# Patient Record
Sex: FEMALE | Race: BLACK OR AFRICAN AMERICAN | Employment: UNEMPLOYED | URBAN - METROPOLITAN AREA
[De-identification: names, ages, dates, MRNs, and addresses within clinical notes are randomized per-mention and may not be internally consistent; named-entity substitution may affect disease eponyms.]

---

## 2019-02-04 ENCOUNTER — HOSPITAL ENCOUNTER (EMERGENCY)
Age: 39
Discharge: OTHER INSTITUTION | End: 2019-02-04
Attending: EMERGENCY MEDICINE
Payer: MEDICARE

## 2019-02-04 ENCOUNTER — HOSPITAL ENCOUNTER (INPATIENT)
Age: 39
LOS: 7 days | Discharge: HOME | DRG: 885 | End: 2019-02-11
Attending: PSYCHIATRY & NEUROLOGY | Admitting: PSYCHIATRY & NEUROLOGY
Payer: MEDICARE

## 2019-02-04 VITALS
HEART RATE: 86 BPM | DIASTOLIC BLOOD PRESSURE: 99 MMHG | TEMPERATURE: 98.1 F | OXYGEN SATURATION: 97 % | RESPIRATION RATE: 16 BRPM | BODY MASS INDEX: 36.34 KG/M2 | WEIGHT: 232 LBS | SYSTOLIC BLOOD PRESSURE: 139 MMHG

## 2019-02-04 DIAGNOSIS — F31.9 BIPOLAR DISORDER WITH PSYCHOTIC FEATURES (HCC): ICD-10-CM

## 2019-02-04 DIAGNOSIS — Z00.8 MEDICAL CLEARANCE FOR PSYCHIATRIC ADMISSION: Primary | ICD-10-CM

## 2019-02-04 PROBLEM — F31.10 BIPOLAR I DISORDER WITH MANIA (HCC): Status: ACTIVE | Noted: 2019-02-04

## 2019-02-04 LAB
ALBUMIN SERPL-MCNC: 4 G/DL (ref 3.5–5)
ALBUMIN/GLOB SERPL: 1.1 {RATIO} (ref 1.1–2.2)
ALP SERPL-CCNC: 55 U/L (ref 45–117)
ALT SERPL-CCNC: 39 U/L (ref 12–78)
AMPHET UR QL SCN: NEGATIVE
ANION GAP SERPL CALC-SCNC: 10 MMOL/L (ref 5–15)
APPEARANCE UR: ABNORMAL
AST SERPL-CCNC: 30 U/L (ref 15–37)
BACTERIA URNS QL MICRO: NEGATIVE /HPF
BARBITURATES UR QL SCN: NEGATIVE
BENZODIAZ UR QL: NEGATIVE
BILIRUB SERPL-MCNC: 0.2 MG/DL (ref 0.2–1)
BILIRUB UR QL: NEGATIVE
BUN SERPL-MCNC: 14 MG/DL (ref 6–20)
BUN/CREAT SERPL: 16 (ref 12–20)
CALCIUM SERPL-MCNC: 9.5 MG/DL (ref 8.5–10.1)
CANNABINOIDS UR QL SCN: NEGATIVE
CHLORIDE SERPL-SCNC: 102 MMOL/L (ref 97–108)
CO2 SERPL-SCNC: 24 MMOL/L (ref 21–32)
COCAINE UR QL SCN: NEGATIVE
COLOR UR: ABNORMAL
CREAT SERPL-MCNC: 0.85 MG/DL (ref 0.55–1.02)
DRUG SCRN COMMENT,DRGCM: NORMAL
EPITH CASTS URNS QL MICRO: NORMAL /LPF
ERYTHROCYTE [DISTWIDTH] IN BLOOD BY AUTOMATED COUNT: 18.1 % (ref 11.5–14.5)
ETHANOL SERPL-MCNC: <10 MG/DL
GLOBULIN SER CALC-MCNC: 3.8 G/DL (ref 2–4)
GLUCOSE SERPL-MCNC: 105 MG/DL (ref 65–100)
GLUCOSE UR STRIP.AUTO-MCNC: NEGATIVE MG/DL
HCG UR QL: NEGATIVE
HCT VFR BLD AUTO: 33.2 % (ref 35–47)
HGB BLD-MCNC: 9.9 G/DL (ref 11.5–16)
HGB UR QL STRIP: NEGATIVE
KETONES UR QL STRIP.AUTO: NEGATIVE MG/DL
LEUKOCYTE ESTERASE UR QL STRIP.AUTO: ABNORMAL
MCH RBC QN AUTO: 20.1 PG (ref 26–34)
MCHC RBC AUTO-ENTMCNC: 29.8 G/DL (ref 30–36.5)
MCV RBC AUTO: 67.5 FL (ref 80–99)
METHADONE UR QL: NEGATIVE
NITRITE UR QL STRIP.AUTO: NEGATIVE
NRBC # BLD: 0 K/UL (ref 0–0.01)
NRBC BLD-RTO: 0 PER 100 WBC
OPIATES UR QL: NEGATIVE
PCP UR QL: NEGATIVE
PH UR STRIP: 6 [PH] (ref 5–8)
PLATELET # BLD AUTO: 432 K/UL (ref 150–400)
PMV BLD AUTO: 9.1 FL (ref 8.9–12.9)
POTASSIUM SERPL-SCNC: 4 MMOL/L (ref 3.5–5.1)
PROT SERPL-MCNC: 7.8 G/DL (ref 6.4–8.2)
PROT UR STRIP-MCNC: 30 MG/DL
RBC # BLD AUTO: 4.92 M/UL (ref 3.8–5.2)
RBC #/AREA URNS HPF: NORMAL /HPF (ref 0–5)
SODIUM SERPL-SCNC: 136 MMOL/L (ref 136–145)
SP GR UR REFRACTOMETRY: 1.02 (ref 1–1.03)
UROBILINOGEN UR QL STRIP.AUTO: 0.2 EU/DL (ref 0.2–1)
WBC # BLD AUTO: 6.4 K/UL (ref 3.6–11)
WBC URNS QL MICRO: NORMAL /HPF (ref 0–4)

## 2019-02-04 PROCEDURE — 99285 EMERGENCY DEPT VISIT HI MDM: CPT

## 2019-02-04 PROCEDURE — 85027 COMPLETE CBC AUTOMATED: CPT

## 2019-02-04 PROCEDURE — 80307 DRUG TEST PRSMV CHEM ANLYZR: CPT

## 2019-02-04 PROCEDURE — 36415 COLL VENOUS BLD VENIPUNCTURE: CPT

## 2019-02-04 PROCEDURE — 81001 URINALYSIS AUTO W/SCOPE: CPT

## 2019-02-04 PROCEDURE — 80053 COMPREHEN METABOLIC PANEL: CPT

## 2019-02-04 PROCEDURE — 81025 URINE PREGNANCY TEST: CPT

## 2019-02-04 PROCEDURE — 65220000003 HC RM SEMIPRIVATE PSYCH

## 2019-02-04 RX ORDER — GLUCOSAMINE SULFATE 1500 MG
1000 POWDER IN PACKET (EA) ORAL DAILY
Status: ON HOLD | COMMUNITY
End: 2019-02-07

## 2019-02-04 RX ORDER — DOXYCYCLINE 100 MG/1
100 CAPSULE ORAL
Status: ON HOLD | COMMUNITY
End: 2019-02-07

## 2019-02-04 RX ORDER — ZOLPIDEM TARTRATE 10 MG/1
10 TABLET ORAL
Status: DISCONTINUED | OUTPATIENT
Start: 2019-02-04 | End: 2019-02-04 | Stop reason: DRUGHIGH

## 2019-02-04 RX ORDER — LORAZEPAM 1 MG/1
1 TABLET ORAL
Status: DISCONTINUED | OUTPATIENT
Start: 2019-02-04 | End: 2019-02-08

## 2019-02-04 RX ORDER — BENZTROPINE MESYLATE 1 MG/ML
2 INJECTION INTRAMUSCULAR; INTRAVENOUS
Status: DISCONTINUED | OUTPATIENT
Start: 2019-02-04 | End: 2019-02-11 | Stop reason: HOSPADM

## 2019-02-04 RX ORDER — LORAZEPAM 2 MG/ML
2 INJECTION INTRAMUSCULAR
Status: DISCONTINUED | OUTPATIENT
Start: 2019-02-04 | End: 2019-02-11 | Stop reason: HOSPADM

## 2019-02-04 RX ORDER — DICLOFENAC SODIUM 75 MG/1
TABLET, DELAYED RELEASE ORAL
Status: ON HOLD | COMMUNITY
End: 2019-02-07

## 2019-02-04 RX ORDER — NAPROXEN 500 MG/1
500 TABLET ORAL
Status: ON HOLD | COMMUNITY
End: 2019-02-07

## 2019-02-04 RX ORDER — QUETIAPINE FUMARATE 300 MG/1
300 TABLET, FILM COATED ORAL
COMMUNITY
End: 2019-02-11

## 2019-02-04 RX ORDER — OLANZAPINE 5 MG/1
5 TABLET ORAL
Status: DISCONTINUED | OUTPATIENT
Start: 2019-02-04 | End: 2019-02-08

## 2019-02-04 RX ORDER — BACLOFEN 10 MG/1
10 TABLET ORAL 3 TIMES DAILY
Status: ON HOLD | COMMUNITY
End: 2019-02-07

## 2019-02-04 RX ORDER — QUETIAPINE FUMARATE 50 MG/1
50 TABLET, FILM COATED ORAL
Status: ON HOLD | COMMUNITY
End: 2019-02-07

## 2019-02-04 RX ORDER — AMLODIPINE BESYLATE 10 MG/1
10 TABLET ORAL DAILY
COMMUNITY

## 2019-02-04 RX ORDER — ACETAMINOPHEN 325 MG/1
650 TABLET ORAL
Status: DISCONTINUED | OUTPATIENT
Start: 2019-02-04 | End: 2019-02-11 | Stop reason: HOSPADM

## 2019-02-04 RX ORDER — HYDROXYZINE 50 MG/1
50 TABLET, FILM COATED ORAL
COMMUNITY
End: 2019-02-11

## 2019-02-04 RX ORDER — ZOLPIDEM TARTRATE 5 MG/1
5 TABLET ORAL
Status: DISCONTINUED | OUTPATIENT
Start: 2019-02-04 | End: 2019-02-11 | Stop reason: HOSPADM

## 2019-02-04 RX ORDER — LABETALOL 100 MG/1
100 TABLET, FILM COATED ORAL 2 TIMES DAILY
COMMUNITY

## 2019-02-04 RX ORDER — ADHESIVE BANDAGE
30 BANDAGE TOPICAL DAILY PRN
Status: DISCONTINUED | OUTPATIENT
Start: 2019-02-04 | End: 2019-02-11 | Stop reason: HOSPADM

## 2019-02-04 RX ORDER — LEVOTHYROXINE SODIUM 50 UG/1
50 TABLET ORAL
COMMUNITY

## 2019-02-04 RX ORDER — BENZTROPINE MESYLATE 2 MG/1
2 TABLET ORAL
Status: DISCONTINUED | OUTPATIENT
Start: 2019-02-04 | End: 2019-02-11 | Stop reason: HOSPADM

## 2019-02-04 RX ORDER — QUETIAPINE FUMARATE 200 MG/1
200 TABLET, FILM COATED ORAL
COMMUNITY

## 2019-02-04 RX ORDER — IBUPROFEN 200 MG
1 TABLET ORAL
Status: DISCONTINUED | OUTPATIENT
Start: 2019-02-04 | End: 2019-02-11 | Stop reason: HOSPADM

## 2019-02-04 RX ORDER — ESCITALOPRAM OXALATE 10 MG/1
10 TABLET ORAL DAILY
COMMUNITY

## 2019-02-04 RX ORDER — IBUPROFEN 400 MG/1
400 TABLET ORAL
Status: DISCONTINUED | OUTPATIENT
Start: 2019-02-04 | End: 2019-02-11 | Stop reason: HOSPADM

## 2019-02-04 RX ORDER — DICYCLOMINE HYDROCHLORIDE 10 MG/1
10 CAPSULE ORAL
COMMUNITY

## 2019-02-04 NOTE — BH NOTES
TRANSFER - IN REPORT:    Verbal report received from [facility] - KAYODE RN(name) on Navid Rivas  being received from [facility] ED(unit) for routine progression of care      Report consisted of patients Situation, Background, Assessment and   Recommendations(SBAR). Information from the following report(s) SBAR was reviewed with the receiving nurse. Opportunity for questions and clarification was provided. Assessment completed upon patients arrival to unit and care assumed.

## 2019-02-04 NOTE — ED NOTES
Emergency Department Nursing Plan of Care The Nursing Plan of Care is developed from the Nursing assessment and Emergency Department Attending provider initial evaluation. The plan of care may be reviewed in the ED Provider note. The Plan of Care was developed with the following considerations:  
Patient / Family readiness to learn indicated by:verbalized understanding Persons(s) to be included in education: patient Barriers to Learning/Limitations:No 
 
Signed Casper Corrales RN   
2/4/2019   12:23 PM
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
## 2019-02-05 LAB
CHOLEST SERPL-MCNC: 169 MG/DL
GLUCOSE P FAST SERPL-MCNC: 88 MG/DL (ref 65–100)
HDLC SERPL-MCNC: 79 MG/DL
HDLC SERPL: 2.1 {RATIO} (ref 0–5)
LDLC SERPL CALC-MCNC: 77 MG/DL (ref 0–100)
LIPID PROFILE,FLP: NORMAL
TRIGL SERPL-MCNC: 65 MG/DL (ref ?–150)
TSH SERPL DL<=0.05 MIU/L-ACNC: 5.27 UIU/ML (ref 0.36–3.74)
VLDLC SERPL CALC-MCNC: 13 MG/DL

## 2019-02-05 PROCEDURE — 80061 LIPID PANEL: CPT

## 2019-02-05 PROCEDURE — 36415 COLL VENOUS BLD VENIPUNCTURE: CPT

## 2019-02-05 PROCEDURE — 84443 ASSAY THYROID STIM HORMONE: CPT

## 2019-02-05 PROCEDURE — 74011250637 HC RX REV CODE- 250/637: Performed by: PSYCHIATRY & NEUROLOGY

## 2019-02-05 PROCEDURE — 65220000003 HC RM SEMIPRIVATE PSYCH

## 2019-02-05 PROCEDURE — 82947 ASSAY GLUCOSE BLOOD QUANT: CPT

## 2019-02-05 PROCEDURE — 74011250637 HC RX REV CODE- 250/637: Performed by: NURSE PRACTITIONER

## 2019-02-05 RX ORDER — AMLODIPINE BESYLATE 5 MG/1
5 TABLET ORAL DAILY
Status: DISCONTINUED | OUTPATIENT
Start: 2019-02-06 | End: 2019-02-07

## 2019-02-05 RX ORDER — LEVOTHYROXINE SODIUM 25 UG/1
50 TABLET ORAL
Status: DISCONTINUED | OUTPATIENT
Start: 2019-02-06 | End: 2019-02-07

## 2019-02-05 RX ORDER — DICYCLOMINE HYDROCHLORIDE 10 MG/1
10 CAPSULE ORAL 4 TIMES DAILY
Status: DISCONTINUED | OUTPATIENT
Start: 2019-02-05 | End: 2019-02-07

## 2019-02-05 RX ORDER — LABETALOL 100 MG/1
100 TABLET, FILM COATED ORAL 2 TIMES DAILY
Status: DISCONTINUED | OUTPATIENT
Start: 2019-02-05 | End: 2019-02-11 | Stop reason: HOSPADM

## 2019-02-05 RX ORDER — ONDANSETRON 4 MG/1
4 TABLET, ORALLY DISINTEGRATING ORAL
Status: DISCONTINUED | OUTPATIENT
Start: 2019-02-05 | End: 2019-02-11 | Stop reason: HOSPADM

## 2019-02-05 RX ADMIN — LORAZEPAM 1 MG: 1 TABLET ORAL at 13:54

## 2019-02-05 RX ADMIN — ZOLPIDEM TARTRATE 5 MG: 5 TABLET ORAL at 21:31

## 2019-02-05 RX ADMIN — DICYCLOMINE HYDROCHLORIDE 10 MG: 10 CAPSULE ORAL at 21:52

## 2019-02-05 RX ADMIN — LABETALOL HYDROCHLORIDE 100 MG: 100 TABLET, FILM COATED ORAL at 18:31

## 2019-02-05 RX ADMIN — DICYCLOMINE HYDROCHLORIDE 10 MG: 10 CAPSULE ORAL at 18:31

## 2019-02-05 NOTE — PROGRESS NOTES
NUTRITION       Nutrition screening referral was triggered based on results obtained during nursing admission assessment for \"unsure wt loss. \"    The patient's chart was reviewed and nutrition assessment is not indicated at this time. No weight loss or decrease in appetite per chart review. Wt Readings from Last 10 Encounters:   02/04/19 108 kg (238 lb)   02/04/19 105.2 kg (232 lb)   06/13/14 73.5 kg (162 lb)     Please consult if indicated. Thank you.      Dee Gentile RD

## 2019-02-05 NOTE — ED NOTES
Bedside and Verbal shift change report given to Ladan Major (oncoming nurse) by Clem Yo RN (offgoing nurse). Report included the following information SBAR, Kardex and ED Summary.

## 2019-02-05 NOTE — PROGRESS NOTES
Problem: Discharge Planning  Goal: *Discharge to safe environment  Outcome: Progressing Towards Goal  She is homeless   She has support from her mental health skill builder   She has opened her case with [facility]   Goal: *Knowledge of medication management  Outcome: Progressing Towards Goal  She is medication complaint   Goal: *Knowledge of discharge instructions  Outcome: Not Progressing Towards Goal  She is unable to verbalize discharge instructions

## 2019-02-05 NOTE — BH NOTES
Zay triage hospitalist for medical consult. However pt is now in bed, had not slept for 48 hours and has no urgent medical need. It is requested that pt be seen in am due to her being asleep now.   It will be passed on for am to recall consult to triage hospitalist

## 2019-02-05 NOTE — BH NOTES
PRN Medication Documentation    Specific patient behavior that led to need for PRN medication: \"I need something for anxiety\"  Staff interventions attempted prior to PRN being given: discussed coping skills  PRN medication given: ativan 5 mg po  Patient response/effectiveness of PRN medication: will continue to monitor

## 2019-02-05 NOTE — BH NOTES
GROUP THERAPY PROGRESS NOTE    Andrea Linn is participating in [place].      Group time: 15 minutes    Personal goal for participation: patient goal is to talk with doctor    Goal orientation: community    Group therapy participation: active    Therapeutic interventions reviewed and discussed: yes    Impression of participation: engaged
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
## 2019-02-05 NOTE — PROGRESS NOTES
Problem: Falls - Risk of  Goal: *Absence of Falls  Document Mc Fall Risk and appropriate interventions in the flowsheet. Outcome: Progressing Towards Goal  Fall Risk Interventions:  New patient on the evening shift. She was educated on the risk of walls when waking and after taking medications. Patient stated she understood and would be very careful. Is assistance is need she was instructed to call. She is currently sleeping, no stress noted  Mobility Interventions: Assess mobility with egress test    Mentation Interventions: Adequate sleep, hydration, pain control    Medication Interventions: Teach patient to arise slowly    Elimination Interventions:  Toileting schedule/hourly rounds    History of Falls Interventions: Door open when patient unattended
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
## 2019-02-05 NOTE — INTERDISCIPLINARY ROUNDS
Behavioral Health Interdisciplinary Rounds     Patient Name: Jose Mead  Age: 34 y.o.   Room/Bed:  732/02  Primary Diagnosis: <principal problem not specified>   Admission Status: Voluntary     Readmission within 30 days: no  Power of  in place: no  Patient requires a blocked bed: no          Reason for blocked bed:     VTE Prophylaxis: No    Mobility needs/Fall risk: no  Flu Vaccine : no   Nutritional Plan: no  Consults: H and P         Labs/Testing due today?: yes    Sleep hours: 7.0       Participation in Care/Groups:  New patient  Medication Compliant?: new patient  PRNS (last 24 hours): None    Restraints (last 24 hours):  no     CIWA (range last 24 hours):     COWS (range last 24 hours):      Alcohol screening (AUDIT) completed -   AUDIT Score: 0     If applicable, date SBIRT discussed in treatment team AND documented:     Tobacco - patient is a smoker: Have You Used Tobacco in the Past 30 Days: Never a smoker  Illegal Drugs use: Have You Used Any Illegal Substances Over the Past 12 Months: No    24 hour chart check complete: no     Patient goal(s) for today:   Treatment team focus/goals: Plan to restart her medications   Progress note She was somewhat irritable in treatment team.  Stated she is always compliant with her medications    LOS:  1  Expected LOS: TBD    Financial concerns/prescription coverage:  Medicare   Date of last family contact:   Refusing family contact     Family requesting physician contact today:  no  Discharge plan: homeless   Guns in the home:   no      Outpatient provider(s): PAMELA     Participating treatment team members: Trenton [address]
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
## 2019-02-05 NOTE — ED NOTES
TRANSFER - OUT REPORT: 
 
Verbal report given to Lucero at [facility] (name) on Magaly Lee  being transferred to [facility] (unit) for routine progression of care Report consisted of patients Situation, Background, Assessment and  
Recommendations(SBAR). Information from the following report(s) SBAR, Kardex and ED Summary was reviewed with the receiving nurse. Lines:    
 
Opportunity for questions and clarification was provided.    
 
Patient transported with:
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
## 2019-02-06 PROCEDURE — 74011250637 HC RX REV CODE- 250/637: Performed by: NURSE PRACTITIONER

## 2019-02-06 PROCEDURE — 74011250636 HC RX REV CODE- 250/636: Performed by: PSYCHIATRY & NEUROLOGY

## 2019-02-06 PROCEDURE — 74011250637 HC RX REV CODE- 250/637: Performed by: PSYCHIATRY & NEUROLOGY

## 2019-02-06 PROCEDURE — 74011000250 HC RX REV CODE- 250: Performed by: PSYCHIATRY & NEUROLOGY

## 2019-02-06 PROCEDURE — 65220000003 HC RM SEMIPRIVATE PSYCH

## 2019-02-06 RX ORDER — QUETIAPINE FUMARATE 100 MG/1
200 TABLET, FILM COATED ORAL DAILY
Status: DISCONTINUED | OUTPATIENT
Start: 2019-02-06 | End: 2019-02-07

## 2019-02-06 RX ORDER — QUETIAPINE FUMARATE 100 MG/1
300 TABLET, FILM COATED ORAL
Status: DISCONTINUED | OUTPATIENT
Start: 2019-02-06 | End: 2019-02-07

## 2019-02-06 RX ORDER — QUETIAPINE FUMARATE 25 MG/1
50 TABLET, FILM COATED ORAL
Status: DISCONTINUED | OUTPATIENT
Start: 2019-02-06 | End: 2019-02-11 | Stop reason: HOSPADM

## 2019-02-06 RX ORDER — HYDROXYZINE 50 MG/1
50 TABLET, FILM COATED ORAL
Status: DISCONTINUED | OUTPATIENT
Start: 2019-02-06 | End: 2019-02-07

## 2019-02-06 RX ORDER — QUETIAPINE FUMARATE 25 MG/1
50 TABLET, FILM COATED ORAL 2 TIMES DAILY
Status: DISCONTINUED | OUTPATIENT
Start: 2019-02-06 | End: 2019-02-06

## 2019-02-06 RX ORDER — MELATONIN
1000 DAILY
Status: DISCONTINUED | OUTPATIENT
Start: 2019-02-06 | End: 2019-02-07

## 2019-02-06 RX ORDER — QUETIAPINE FUMARATE 100 MG/1
300 TABLET, FILM COATED ORAL DAILY
Status: DISCONTINUED | OUTPATIENT
Start: 2019-02-06 | End: 2019-02-06

## 2019-02-06 RX ORDER — ESCITALOPRAM OXALATE 10 MG/1
10 TABLET ORAL DAILY
Status: DISCONTINUED | OUTPATIENT
Start: 2019-02-06 | End: 2019-02-07

## 2019-02-06 RX ADMIN — QUETIAPINE FUMARATE 300 MG: 100 TABLET ORAL at 21:38

## 2019-02-06 RX ADMIN — DICYCLOMINE HYDROCHLORIDE 10 MG: 10 CAPSULE ORAL at 21:39

## 2019-02-06 RX ADMIN — LORAZEPAM 1 MG: 1 TABLET ORAL at 20:09

## 2019-02-06 RX ADMIN — LEVOTHYROXINE SODIUM 50 MCG: 25 TABLET ORAL at 06:42

## 2019-02-06 RX ADMIN — LORAZEPAM 1 MG: 1 TABLET ORAL at 07:56

## 2019-02-06 RX ADMIN — LABETALOL HYDROCHLORIDE 100 MG: 100 TABLET, FILM COATED ORAL at 08:49

## 2019-02-06 RX ADMIN — DICYCLOMINE HYDROCHLORIDE 10 MG: 10 CAPSULE ORAL at 17:15

## 2019-02-06 RX ADMIN — ESCITALOPRAM OXALATE 10 MG: 10 TABLET ORAL at 12:56

## 2019-02-06 RX ADMIN — AMLODIPINE BESYLATE 5 MG: 5 TABLET ORAL at 07:56

## 2019-02-06 RX ADMIN — WATER 20 MG: 1 INJECTION INTRAMUSCULAR; INTRAVENOUS; SUBCUTANEOUS at 10:09

## 2019-02-06 RX ADMIN — DICYCLOMINE HYDROCHLORIDE 10 MG: 10 CAPSULE ORAL at 08:49

## 2019-02-06 RX ADMIN — LABETALOL HYDROCHLORIDE 100 MG: 100 TABLET, FILM COATED ORAL at 17:16

## 2019-02-06 RX ADMIN — QUETIAPINE FUMARATE 200 MG: 100 TABLET ORAL at 10:10

## 2019-02-06 RX ADMIN — DICYCLOMINE HYDROCHLORIDE 10 MG: 10 CAPSULE ORAL at 12:56

## 2019-02-06 RX ADMIN — VITAMIN D, TAB 1000IU (100/BT) 1000 UNITS: 25 TAB at 12:56

## 2019-02-06 NOTE — BH NOTES
GROUP THERAPY PROGRESS NOTE    Rikki Gibbons did not participate in a 60 minute Acute Unit Process Group, with a focus identifying feelings, planning for the rest of the day, and discussing discharge.

## 2019-02-06 NOTE — PROGRESS NOTES
Problem: Falls - Risk of  Goal: *Absence of Falls  Document Mc Fall Risk and appropriate interventions in the flowsheet. Outcome: Progressing Towards Goal  Fall Risk Interventions:  Mobility Interventions: Assess mobility with egress test  In bed asleep with respirations noted as even and unlabored as chest was rising and falling. Will continue to monitor for safety and 15 minute checks throughout shift. Mentation Interventions: Adequate sleep, hydration, pain control    Medication Interventions: Teach patient to arise slowly    Elimination Interventions:  Toilet paper/wipes in reach    History of Falls Interventions: Door open when patient unattended
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
## 2019-02-06 NOTE — BH NOTES
PRN Medication Documentation at 2131    Specific patient behavior that led to need for PRN medication: Patient requesting sleeping aid  Staff interventions attempted prior to PRN being given: decreased stimuli  PRN medication given: Ambien 5 mg PO  Patient response/effectiveness of PRN medication: n/a

## 2019-02-06 NOTE — BH NOTES
GROUP THERAPY PROGRESS NOTE    Bam Gomes is participating in [place].      Group time: 20 minutes    Personal goal for participation: patient goal is to talk with the doctor    Goal orientation: community    Group therapy participation: minimal    Therapeutic interventions reviewed and discussed: yes    Impression of participation: engaged

## 2019-02-06 NOTE — INTERDISCIPLINARY ROUNDS
Behavioral Health Interdisciplinary Rounds     Patient Name: Floyd Richards  Age: 34 y.o. Room/Bed:  732/02  Primary Diagnosis: Bipolar I disorder with diane ([facility] [address]   Admission Status: Voluntary     Readmission within 30 days: no  Power of  in place: no  Patient requires a blocked bed: no          Reason for blocked bed:     VTE Prophylaxis: No    Mobility needs/Fall risk: no  Flu Vaccine : no   Nutritional Plan: no  Consults:          Labs/Testing due today?: no    Sleep hours: 7.50       Participation in Care/Groups:  yes  Medication Compliant?: Yes  PRNS (last 24 hours): Sleep Aid    Restraints (last 24 hours):  no     CIWA (range last 24 hours):     COWS (range last 24 hours):      Alcohol screening (AUDIT) completed -   AUDIT Score: 0     If applicable, date SBIRT discussed in treatment team AND documented:     Tobacco - patient is a smoker: Have You Used Tobacco in the Past 30 Days: Never a smoker  Illegal Drugs use: Have You Used Any Illegal Substances Over the Past 12 Months: No    24 hour chart check complete: yes     Patient goal(s) for today:   Treatment team focus/goals: Plan to restart her medications.     Progress note: She was very agitated today and required prn's     LOS:  2  Expected LOS: TBD     Financial concerns/prescription coverage:  Medicare   Date of last family contact: ROB spoke to her mental health skill builder yesterday     Family requesting physician contact today:    Discharge plan:she is homeless at this time   Guns in the home:   no      Outpatient provider(s): refer to [address] [place] [address] Road - just opened her case before admission     Participating treatment team members: Floyd Richards, * (assigned SW),
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
## 2019-02-06 NOTE — PROGRESS NOTES
Problem: Suicide/Homicide (Adult/Pediatric)  Goal: *STG: Remains safe in hospital  Outcome: Progressing Towards Goal  Received pt initially resting quietly in bed. Later up during meal time to eat dinner tray. Appears  less irritable and angry. Mood is more subdued. Staff will continue to monitor q 15 min checks.

## 2019-02-06 NOTE — BH NOTES
PRN Medication Documentation    Specific patient behavior that led to need for PRN medication:  Pt. Yelling  \"You had better give me something right now  Or I'm going to go off\"       \"No one is going to tell me what I'm going to do\"  Staff interventions attempted prior to PRN being given:  Verbally de-escalate  PRN medication given:  geodon 20 mg IM  Patient response/effectiveness of PRN medication:  Will  Continue to monitor

## 2019-02-06 NOTE — PROGRESS NOTES
Problem: Depressed Mood (Adult/Pediatric)  Goal: *STG: Remains safe in hospital  Outcome: Progressing Towards Goal  Visible on unit. Completed H and P with internal medicine. Verbalized  PTA home medications. Remains slightly paranoid and guarded.

## 2019-02-06 NOTE — PROGRESS NOTES
Problem: Suicide/Homicide (Adult/Pediatric)  Goal: *STG: Remains safe in hospital  Outcome: Progressing Towards Goal  Pt denies any suicidal or homicidal thoughts. Contracts for safety. Remains on q 15 min safety checks. Goal: *STG: Attends activities and groups  Outcome: Progressing Towards Goal  Group attendance rate has increased with appropriate interactions. Goal: *STG/LTG: Complies with medication therapy  Outcome: Progressing Towards Goal  Has been medication compliant. Verbalizes by teach back understanding of them.     Problem: Depressed Mood (Adult/Pediatric)  Goal: *STG: Verbalizes anger, guilt, and other feelings in a constructive manor  Outcome: Not Progressing Towards Goal  Tends to express feelings with negative criticism frequently of staff and other patients

## 2019-02-07 PROCEDURE — 65220000003 HC RM SEMIPRIVATE PSYCH

## 2019-02-07 PROCEDURE — 74011250637 HC RX REV CODE- 250/637: Performed by: PSYCHIATRY & NEUROLOGY

## 2019-02-07 PROCEDURE — 74011250637 HC RX REV CODE- 250/637: Performed by: NURSE PRACTITIONER

## 2019-02-07 RX ORDER — ESCITALOPRAM OXALATE 10 MG/1
10 TABLET ORAL
Status: DISCONTINUED | OUTPATIENT
Start: 2019-02-08 | End: 2019-02-11

## 2019-02-07 RX ORDER — QUETIAPINE FUMARATE 100 MG/1
350 TABLET, FILM COATED ORAL
Status: DISCONTINUED | OUTPATIENT
Start: 2019-02-07 | End: 2019-02-11 | Stop reason: HOSPADM

## 2019-02-07 RX ORDER — HYDROXYZINE 50 MG/1
50 TABLET, FILM COATED ORAL
Status: DISCONTINUED | OUTPATIENT
Start: 2019-02-07 | End: 2019-02-08

## 2019-02-07 RX ORDER — QUETIAPINE FUMARATE 100 MG/1
200 TABLET, FILM COATED ORAL
Status: DISCONTINUED | OUTPATIENT
Start: 2019-02-08 | End: 2019-02-11 | Stop reason: HOSPADM

## 2019-02-07 RX ORDER — AMLODIPINE BESYLATE 5 MG/1
10 TABLET ORAL DAILY
Status: DISCONTINUED | OUTPATIENT
Start: 2019-02-08 | End: 2019-02-11 | Stop reason: HOSPADM

## 2019-02-07 RX ORDER — DICYCLOMINE HYDROCHLORIDE 10 MG/1
10 CAPSULE ORAL
Status: DISCONTINUED | OUTPATIENT
Start: 2019-02-07 | End: 2019-02-11 | Stop reason: HOSPADM

## 2019-02-07 RX ORDER — CETIRIZINE HCL 10 MG
10 TABLET ORAL
Status: DISCONTINUED | OUTPATIENT
Start: 2019-02-07 | End: 2019-02-11 | Stop reason: HOSPADM

## 2019-02-07 RX ORDER — LEVOTHYROXINE SODIUM 25 UG/1
50 TABLET ORAL
Status: DISCONTINUED | OUTPATIENT
Start: 2019-02-08 | End: 2019-02-11 | Stop reason: HOSPADM

## 2019-02-07 RX ADMIN — DICYCLOMINE HYDROCHLORIDE 10 MG: 10 CAPSULE ORAL at 17:24

## 2019-02-07 RX ADMIN — QUETIAPINE FUMARATE 200 MG: 100 TABLET ORAL at 08:16

## 2019-02-07 RX ADMIN — LABETALOL HYDROCHLORIDE 100 MG: 100 TABLET, FILM COATED ORAL at 17:24

## 2019-02-07 RX ADMIN — HYDROXYZINE HYDROCHLORIDE 50 MG: 50 TABLET, FILM COATED ORAL at 19:39

## 2019-02-07 RX ADMIN — QUETIAPINE FUMARATE 350 MG: 100 TABLET ORAL at 21:08

## 2019-02-07 RX ADMIN — VITAMIN D, TAB 1000IU (100/BT) 1000 UNITS: 25 TAB at 08:16

## 2019-02-07 RX ADMIN — DICYCLOMINE HYDROCHLORIDE 10 MG: 10 CAPSULE ORAL at 12:29

## 2019-02-07 RX ADMIN — ESCITALOPRAM OXALATE 10 MG: 10 TABLET ORAL at 08:16

## 2019-02-07 RX ADMIN — DICYCLOMINE HYDROCHLORIDE 10 MG: 10 CAPSULE ORAL at 08:17

## 2019-02-07 RX ADMIN — AMLODIPINE BESYLATE 5 MG: 5 TABLET ORAL at 08:16

## 2019-02-07 RX ADMIN — CETIRIZINE HYDROCHLORIDE 10 MG: 10 TABLET, FILM COATED ORAL at 21:08

## 2019-02-07 RX ADMIN — LEVOTHYROXINE SODIUM 50 MCG: 25 TABLET ORAL at 06:33

## 2019-02-07 RX ADMIN — LABETALOL HYDROCHLORIDE 100 MG: 100 TABLET, FILM COATED ORAL at 08:17

## 2019-02-07 NOTE — PROGRESS NOTES
Problem: Falls - Risk of  Goal: *Absence of Falls  Document Mc Fall Risk and appropriate interventions in the flowsheet. Outcome: Progressing Towards Goal  Fall Risk Interventions:  Mobility Interventions: Assess mobility with egress test  In bed asleep with respirations noted as even and unlabored as chest was rising and falling. Will continue to monitor for safety and 15 minute checks throughout shift  Mentation Interventions: Adequate sleep, hydration, pain control    Medication Interventions: Teach patient to arise slowly    Elimination Interventions:  Toileting schedule/hourly rounds    History of Falls Interventions: Room close to nurse's station
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
## 2019-02-07 NOTE — BH NOTES
PRN Medication Documentation    Specific patient behavior that led to need for PRN medication: Patient complained of increased anxiety  Staff interventions attempted prior to PRN being given: Encouraged coping skills  PRN medication given: Ativan 1 mg PO  Patient response/effectiveness of PRN medication: Will continue to monitor.
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
## 2019-02-07 NOTE — BH NOTES
GROUP THERAPY PROGRESS NOTE    The patient Bernardo Drew is participating in [employer]. Group time: 30 minutes    Personal goal for participation: to orient the patient to the unit.     Goal orientation: successful adoption of unit rules    Group therapy participation: active    Therapeutic interventions reviewed and discussed: Yes    Impression of participation:     Kortney Tijerina  2/7/2019 10:54 AM

## 2019-02-07 NOTE — PROGRESS NOTES
Problem: Falls - Risk of  Goal: *Absence of Falls  Document Mc Fall Risk and appropriate interventions in the flowsheet. Outcome: Progressing Towards Goal  Fall Risk Interventions:  Mobility Interventions: Assess mobility with egress test  Mentation Interventions: Adequate sleep, hydration, pain control  Medication Interventions: Teach patient to arise slowly  Elimination Interventions: Toileting schedule/hourly rounds  History of Falls Interventions: Room close to nurse's station    Problem: Depressed Mood (Adult/Pediatric)  Goal: *STG: Remains safe in hospital  Outcome: Progressing Towards Goal    0730 Pt resting quietly in bed. No needs expressed. Will monitor with Q 15 safety checks. Pt is calm and cooperative with flat affect, compliant with meals and meds.

## 2019-02-07 NOTE — BH NOTES
GROUP THERAPY PROGRESS NOTE    Ji Martin partially participated in the Acute Unit's afternoon Process Group with a focus   on identifying feelings, planning for the rest of the day, and preparing for discharge. Group time: 55 minutes. Personal goal for participation: To increase the capacity to improve ones mood and structure. Goal orientation: The patient will be able to identify their feelings, develop a plan for structuring their day, and discharge planning. Group therapy participation: With prompting, this patient minimally participated in the group. Therapeutic interventions reviewed and discussed: The group members were asked to introduce   themselves to each other and to see if they could identify an emotion they are having and/or let the group know what they want to focus on for the rest of the day as they continue to make discharge plans. Impression of participation: The patient said she was feeling, \"Okay, but I don't really want to share. Jaydemarisel Dominique Fox I want to handle my business with prayer. \" The patient was alert and generally oriented. She expressed no current SI/HI. She may have been responding to internal stimuli. She seemed particularly cautious and possibly paranoid about sharing her thoughts. There was also a hint of [employer] pre-occupation. Her affect was anxious, defensive, and slightly paranoid. Her mood reflected her affect. She did have the ability to sit through the entire group session.

## 2019-02-07 NOTE — PROGRESS NOTES
Problem: Discharge Planning  Goal: *Discharge to safe environment  Outcome: Not Progressing Towards Goal  Pt is currently homeless but she does  have a mental health skill builder - counselor  Goal: *Knowledge of medication management  Outcome: Progressing Towards Goal  Pt is complaint with taking her medications and she does have an understanding of their usage.   Goal: *Knowledge of discharge instructions  Outcome: Progressing Towards Goal  Pt is unable to have realistic conversations with [id number] team in terms of d/c planning

## 2019-02-07 NOTE — BH NOTES
GROUP THERAPY PROGRESS NOTE    Diony Cardona is participating in Reflections.      Group time: 15 minutes    Personal goal for participation: Stated her day was fine, about the same as yesterday    Goal orientation: personal    Group therapy participation: minimal    Therapeutic interventions reviewed and discussed: Reflect on Pt's day    Impression of participation: Pt engagement was minimal

## 2019-02-07 NOTE — PROGRESS NOTES
Problem: Depressed Mood (Adult/Pediatric)  Goal: *STG: Participates in treatment plan  Outcome: Not Progressing Towards Goal  Variance: Other (add note)  Pt lay in bed during music therapy group. Goal: *STG: Verbalizes anger, guilt, and other feelings in a constructive manor  Outcome: Not Progressing Towards Goal  Variance: Other (add note)  Pt offers no self disclosures. Goal: *STG: Demonstrates reduction in symptoms and increase in insight into coping skills/future focused  Outcome: Not Progressing Towards Goal  Variance: Patient Condition  Pt has poor insight into formulating a discharge plan. Goal: *STG: Remains safe in hospital  Outcome: Progressing Towards Goal  Pt demonstrates no self disclosures.

## 2019-02-08 PROCEDURE — 74011250637 HC RX REV CODE- 250/637: Performed by: NURSE PRACTITIONER

## 2019-02-08 PROCEDURE — 74011250637 HC RX REV CODE- 250/637: Performed by: PSYCHIATRY & NEUROLOGY

## 2019-02-08 PROCEDURE — 65220000003 HC RM SEMIPRIVATE PSYCH

## 2019-02-08 RX ORDER — LORAZEPAM 1 MG/1
1 TABLET ORAL
Status: DISCONTINUED | OUTPATIENT
Start: 2019-02-08 | End: 2019-02-11 | Stop reason: HOSPADM

## 2019-02-08 RX ORDER — HYDROXYZINE 50 MG/1
50 TABLET, FILM COATED ORAL
Status: DISCONTINUED | OUTPATIENT
Start: 2019-02-08 | End: 2019-02-11 | Stop reason: HOSPADM

## 2019-02-08 RX ADMIN — CETIRIZINE HYDROCHLORIDE 10 MG: 10 TABLET, FILM COATED ORAL at 21:26

## 2019-02-08 RX ADMIN — LORAZEPAM 1 MG: 1 TABLET ORAL at 11:25

## 2019-02-08 RX ADMIN — LABETALOL HYDROCHLORIDE 100 MG: 100 TABLET, FILM COATED ORAL at 17:05

## 2019-02-08 RX ADMIN — ESCITALOPRAM OXALATE 10 MG: 10 TABLET ORAL at 11:25

## 2019-02-08 RX ADMIN — LABETALOL HYDROCHLORIDE 100 MG: 100 TABLET, FILM COATED ORAL at 08:03

## 2019-02-08 RX ADMIN — AMLODIPINE BESYLATE 10 MG: 5 TABLET ORAL at 08:03

## 2019-02-08 RX ADMIN — HYDROXYZINE HYDROCHLORIDE 50 MG: 50 TABLET, FILM COATED ORAL at 10:39

## 2019-02-08 RX ADMIN — LEVOTHYROXINE SODIUM 50 MCG: 25 TABLET ORAL at 06:26

## 2019-02-08 RX ADMIN — QUETIAPINE FUMARATE 350 MG: 100 TABLET ORAL at 21:25

## 2019-02-08 RX ADMIN — QUETIAPINE FUMARATE 200 MG: 100 TABLET ORAL at 11:25

## 2019-02-08 NOTE — BH NOTES
GROUP THERAPY PROGRESS NOTE    The patient Navid Rivas is participating in [employer]. Group time: 30 minutes    Personal goal for participation: to orient the patient to the unit.     Goal orientation: successful adoption of unit rules    Group therapy participation: active    Therapeutic interventions reviewed and discussed: Yes    Impression of participation:     Denise Castellanos  2/8/2019 10:36 AM

## 2019-02-08 NOTE — PROGRESS NOTES
Problem: Falls - Risk of  Goal: *Absence of Falls  Document Mc Fall Risk and appropriate interventions in the flowsheet. Outcome: Progressing Towards Goal  Fall Risk Interventions:  Mobility Interventions: Assess mobility with egress test  In bed asleep with respirations noted as even and unlabored as chest was rising falling. Will continue to monitor for safety and 15 minute checks throughout shift. Mentation Interventions: Adequate sleep, hydration, pain control    Medication Interventions: Teach patient to arise slowly    Elimination Interventions:  Toileting schedule/hourly rounds    History of Falls Interventions: Room close to nurse's station

## 2019-02-08 NOTE — PROGRESS NOTES
Problem: Patient Education: Go to Patient Education Activity  Goal: Patient/Family Education  Outcome: Progressing Towards Goal  Patient resting in bed, quietly at beginning of shift. Patient appears anxious. Lights dimmed to decrease stimulation. Will monitor with Q15 safety checks. Patient compliant with meds and meals.

## 2019-02-08 NOTE — BH NOTES
GROUP THERAPY PROGRESS NOTE    Melsisa Taina did not participate in a 70 minute Process Group on the Acute Unit with a focus identifying feelings, planning for the day, and reviewing DBT coping skills related to distress management.

## 2019-02-08 NOTE — DISCHARGE INSTRUCTIONS
DISCHARGE SUMMARY    NAME:Josiane Hinson  : 1980  MRN: [id number]    The patient Delisa Johansen exhibits the ability to control behavior in a less restrictive environment. Patient's level of functioning is improving. No assaultive/destructive behavior has been observed for the past 24 hours. No suicidal/homicidal threat or behavior has been observed for the past 24 hours. There is no evidence of serious medication side effects. Patient has not been in physical or protective restraints for at least the past 24 hours. If weapons involved, how are they secured? No weapons involved     Is patient aware of and in agreement with discharge plan? Patient is aware of discharge and is in agreement     Arrangements for medication:  Prescriptions given to patient     Copy of discharge instructions to  provider?:  Yes , fax to [address] [place] [address] Road for transportation home: taxi to homeless point of entry     Keep all follow up appointments as scheduled, continue to take prescribed medications per physician instructions. Mental health crisis number:  [phone] or your local mental health crisis line number at [address] [facility] from Nurse    PATIENT INSTRUCTIONS:    What to do at Home:  Recommended activity: Activity as tolerated. If you experience any of the following symptoms hopeless helpless overwhelming thoughts of harming self or others, uncontrolled racing paranoid thoughts, please call 911 or your local mental health crisis line number at [phone]. *  Please give a list of your current medications to your Primary Care Provider. *  Please update this list whenever your medications are discontinued, doses are      changed, or new medications (including over-the-counter products) are added. *  Please carry medication information at all times in case of emergency situations.     These are general instructions for a healthy lifestyle:    No smoking/ No tobacco products/ Avoid exposure to second hand smoke  Surgeon General's Warning:  Quitting smoking now greatly reduces serious risk to your health. Obesity, smoking, and sedentary lifestyle greatly increases your risk for illness    A healthy diet, regular physical exercise & weight monitoring are important for maintaining a healthy lifestyle    You may be retaining fluid if you have a history of heart failure or if you experience any of the following symptoms:  Weight gain of 3 pounds or more overnight or 5 pounds in a week, increased swelling in our hands or feet or shortness of breath while lying flat in bed. Please call your doctor as soon as you notice any of these symptoms; do not wait until your next office visit. Recognize signs and symptoms of STROKE:    F-face looks uneven    A-arms unable to move or move unevenly    S-speech slurred or non-existent    T-time-call 911 as soon as signs and symptoms begin-DO NOT go       Back to bed or wait to see if you get better-TIME IS BRAIN. Warning Signs of HEART ATTACK     Call 911 if you have these symptoms:   Chest discomfort. Most heart attacks involve discomfort in the center of the chest that lasts more than a few minutes, or that goes away and comes back. It can feel like uncomfortable pressure, squeezing, fullness, or pain.  Discomfort in other areas of the upper body. Symptoms can include pain or discomfort in one or both arms, the back, neck, jaw, or stomach.  Shortness of breath with or without chest discomfort.  Other signs may include breaking out in a cold sweat, nausea, or lightheadedness. Don't wait more than five minutes to call 911 - MINUTES MATTER! Fast action can save your life. Calling 911 is almost always the fastest way to get lifesaving treatment. Emergency Medical Services staff can begin treatment when they arrive -- up to an hour sooner than if someone gets to the hospital by car. The discharge information has been reviewed with the patient.   The patient verbalized understanding. Discharge medications reviewed with the patient and appropriate educational materials and side effects teaching were provided.   ___________________________________________________________________________________________________________________________________

## 2019-02-08 NOTE — BH NOTES
1113- pt appears angry. Verbally hostile. Blaming others. Nursing staff provided phone for pt to call Yocasta Chen related to discharge planning. Pt pacing loud pressured speech. See Delaney Goff RN prn note. 1150- pt pacing day room. Cursing. Blaming others. Delusion of persecution. Extended phone time provided (45 min) for pt to accommodate D/C planning.

## 2019-02-08 NOTE — BH NOTES
PRN Medication Documentation    Specific patient behavior that led to need for PRN medication: Increased anxiety  Staff interventions attempted prior to PRN being given: decreased stimuli, educated on using coping skills  PRN medication given: 50 mg Atarax PO  Patient response/effectiveness of PRN medication: at 2005 patient reported decreased anxiety, \"the medication helped\".

## 2019-02-08 NOTE — PROGRESS NOTES
Problem: Falls - Risk of  Goal: *Absence of Falls  Document Mc Fall Risk and appropriate interventions in the flowsheet. Outcome: Progressing Towards Goal  Fall Risk Interventions:  Medication Interventions: Teach patient to arise slowly    Problem: Depressed Mood (Adult/Pediatric)  Goal: *STG: Complies with medication therapy  Outcome: Progressing Towards Goal    0730 Pt up in room, greets staff appropriately. Appears calm. No needs expressed. Will monitor with Q 15 safety checks. Pt is meal and med complaint. Remains in milieu but has limited interaction with peers. Pt attended community meeting group and began pacing during process group. 1040 PRN Medication Documentation    Specific patient behavior that led to need for PRN medication: anxiety, worried about future plans   Staff interventions attempted prior to PRN being given: walking hallway, therapeutic listening, reassurance  PRN medication given: Atarax 50 mg po  Patient response/effectiveness of PRN medication: 1120 Pt escalating in agitation. 1120 PRN Medication Documentation    Specific patient behavior that led to need for PRN medication: agitated, yelling on phone, pacing, angry, blaming others  Staff interventions attempted prior to PRN being given: removing from group, allowing pt to express frustrations, previous prn  PRN medication given: Ativan 1 mg po  Patient response/effectiveness of PRN medication: 1215 Pt is calm, no longer angry affect. Resting quietly.
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
## 2019-02-08 NOTE — INTERDISCIPLINARY ROUNDS
Behavioral Health Interdisciplinary Rounds     Patient Name: Ric Malik  Age: 34 y.o. Room/Bed:  732/02  Primary Diagnosis: Bipolar I disorder with diane ([address]   Admission Status: Voluntary     Readmission within 30 days: no  Power of  in place: no  Patient requires a blocked bed: no          Reason for blocked bed:     VTE Prophylaxis: No    Mobility needs/Fall risk: no  Flu Vaccine : no   Nutritional Plan: no  Consults:          Labs/Testing due today?: no    Sleep hours: 8+       Participation in Care/Groups:  yes  Medication Compliant?: Yes  PRNS (last 24 hours): Sleep Aid    Restraints (last 24 hours):  no     CIWA (range last 24 hours):     COWS (range last 24 hours):      Alcohol screening (AUDIT) completed -   AUDIT Score: 0     If applicable, date SBIRT discussed in treatment team AND documented:     Tobacco - patient is a smoker: Have You Used Tobacco in the Past 30 Days: Never a smoker  Illegal Drugs use: Have You Used Any Illegal Substances Over the Past 12 Months: No    24 hour chart check complete: yes     Patient goal(s) for today:   Treatment team focus/goals: Plan to encourage groups on the unit and compliance with her medications   Progress note : She was much better today.   Upset she cannot be stepped down to [facility] , because she does not have stable housing , requesting discharge on Monday   LOS:  4  Expected LOS: TBD     Financial concerns/prescription coverage: medicare     Date of last family contact:       Family requesting physician contact today:    Discharge plan: She is requesting discharge to homeless point of entry   Guns in the home:no         Outpatient provider(s):refer to [facility]   Participating treatment team members: Jovanna Rivera, PharmD. - Leila Clifford

## 2019-02-08 NOTE — PROGRESS NOTES
Problem: Patient Education: Go to Patient Education Activity  Goal: Patient/Family Education  Outcome: Progressing Towards Goal  Patient free from falls.  Non-skid foot wear on

## 2019-02-09 PROCEDURE — 74011250637 HC RX REV CODE- 250/637: Performed by: NURSE PRACTITIONER

## 2019-02-09 PROCEDURE — 65220000003 HC RM SEMIPRIVATE PSYCH

## 2019-02-09 PROCEDURE — 74011250637 HC RX REV CODE- 250/637: Performed by: PSYCHIATRY & NEUROLOGY

## 2019-02-09 RX ADMIN — LABETALOL HYDROCHLORIDE 100 MG: 100 TABLET, FILM COATED ORAL at 17:18

## 2019-02-09 RX ADMIN — ESCITALOPRAM OXALATE 10 MG: 10 TABLET ORAL at 12:42

## 2019-02-09 RX ADMIN — LEVOTHYROXINE SODIUM 50 MCG: 25 TABLET ORAL at 06:39

## 2019-02-09 RX ADMIN — DICYCLOMINE HYDROCHLORIDE 10 MG: 10 CAPSULE ORAL at 08:23

## 2019-02-09 RX ADMIN — CETIRIZINE HYDROCHLORIDE 10 MG: 10 TABLET, FILM COATED ORAL at 21:17

## 2019-02-09 RX ADMIN — QUETIAPINE FUMARATE 350 MG: 100 TABLET ORAL at 21:18

## 2019-02-09 RX ADMIN — LABETALOL HYDROCHLORIDE 100 MG: 100 TABLET, FILM COATED ORAL at 08:14

## 2019-02-09 RX ADMIN — QUETIAPINE FUMARATE 200 MG: 100 TABLET ORAL at 12:42

## 2019-02-09 RX ADMIN — AMLODIPINE BESYLATE 10 MG: 5 TABLET ORAL at 08:18

## 2019-02-09 RX ADMIN — DICYCLOMINE HYDROCHLORIDE 10 MG: 10 CAPSULE ORAL at 19:43

## 2019-02-09 NOTE — BH NOTES
CM Note: Pt seen today by weekend Tx team. Pt has the expectations of being d/c on Monday with housing. Pt rejects the shelter and told Team that she has an appt on Ms April Fritz on 2/13/2019 with Erick Garrett. SW asked if plans to staying [place] even thought there is no actual housing?  She said yes, I plan to be in [place] for six months and plan to re-locate to [place] [id number]

## 2019-02-09 NOTE — BH NOTES
6077: PRN Medication Documentation    Specific patient behavior that led to need for PRN medication: Pt requested for abdominal cramping.   PRN medication given: bentyl

## 2019-02-09 NOTE — BH NOTES
GROUP THERAPY PROGRESS NOTE    Andrea Linn is participating in [place] group. Group time: 15 minutes    Personal goal for participation: 'Handle my business\"    Goal orientation: community    Group therapy participation: active    Therapeutic interventions reviewed and discussed:  [place] patient to the unit, review rules and regulations, create goal for the day. Impression of participation: Pt was attentive, and responded appropriately to staff and peers.

## 2019-02-09 NOTE — PROGRESS NOTES
Problem: Falls - Risk of  Goal: *Absence of Falls  Document Mc Fall Risk and appropriate interventions in the flowsheet. Outcome: Progressing Towards Goal  Fall Risk Interventions:  Mobility Interventions: Assess mobility with egress test  In bed asleep with respirations noted as even and unlabored as chest was rising and falling. Will continue to monitor for safety and 15 minute checks throughout shift. Mentation Interventions: Adequate sleep, hydration, pain control    Medication Interventions: Teach patient to arise slowly    Elimination Interventions:  Toileting schedule/hourly rounds    History of Falls Interventions: Room close to nurse's station

## 2019-02-09 NOTE — PROGRESS NOTES
Problem: Suicide/Homicide (Adult/Pediatric)  Goal: *STG: Seeks staff when feelings of self harm or harm towards others arise  Outcome: Progressing Towards Goal  Patient is quiet and defensive upon approach. Staff continue q15 checks and encourage pt to share feelings.

## 2019-02-09 NOTE — BH NOTES
GROUP THERAPY PROGRESS NOTE    Deidre Boucher is participating in reflection group. Group time: 15 minutes    Personal goal for participation: Daily progress    Goal orientation: personal    Group therapy participation: active    Therapeutic interventions reviewed and discussed: Unit rules and regulations, relaxation.     Impression of participation: active

## 2019-02-10 PROCEDURE — 65220000003 HC RM SEMIPRIVATE PSYCH

## 2019-02-10 PROCEDURE — 74011250637 HC RX REV CODE- 250/637: Performed by: NURSE PRACTITIONER

## 2019-02-10 PROCEDURE — 74011250637 HC RX REV CODE- 250/637: Performed by: PSYCHIATRY & NEUROLOGY

## 2019-02-10 RX ADMIN — ONDANSETRON 4 MG: 4 TABLET, ORALLY DISINTEGRATING ORAL at 16:09

## 2019-02-10 RX ADMIN — LORAZEPAM 1 MG: 1 TABLET ORAL at 13:01

## 2019-02-10 RX ADMIN — LEVOTHYROXINE SODIUM 50 MCG: 25 TABLET ORAL at 05:18

## 2019-02-10 RX ADMIN — CETIRIZINE HYDROCHLORIDE 10 MG: 10 TABLET, FILM COATED ORAL at 21:10

## 2019-02-10 RX ADMIN — AMLODIPINE BESYLATE 10 MG: 5 TABLET ORAL at 08:03

## 2019-02-10 RX ADMIN — LABETALOL HYDROCHLORIDE 100 MG: 100 TABLET, FILM COATED ORAL at 08:03

## 2019-02-10 RX ADMIN — ESCITALOPRAM OXALATE 10 MG: 10 TABLET ORAL at 11:52

## 2019-02-10 RX ADMIN — QUETIAPINE FUMARATE 350 MG: 100 TABLET ORAL at 21:10

## 2019-02-10 RX ADMIN — LABETALOL HYDROCHLORIDE 100 MG: 100 TABLET, FILM COATED ORAL at 17:32

## 2019-02-10 RX ADMIN — QUETIAPINE FUMARATE 200 MG: 100 TABLET ORAL at 11:52

## 2019-02-10 RX ADMIN — DICYCLOMINE HYDROCHLORIDE 10 MG: 10 CAPSULE ORAL at 08:05

## 2019-02-10 NOTE — INTERDISCIPLINARY ROUNDS
Behavioral Health Interdisciplinary Rounds     Patient Name: Rolanda Ibanez  Age: [address] y.o.   Room/Bed:  730/01  Primary Diagnosis: Bipolar I disorder with diane ([facility]   Admission Status: Voluntary     Readmission within 30 days: no  Power of  in place: no  Patient requires a blocked bed: no          Reason for blocked bed:  Sleep hours:        Participation in Care/Groups:  yes  Medication Compliant?: Yes  PRNS (last 24 hours): None    Restraints (last 24 hours):  no     Alcohol screening (AUDIT) completed -  AUDIT Score: 0  If applicable, date SBIRT discussed in treatment team AND documented:    Tobacco - patient is a smoker: Have You Used Tobacco in the Past 30 Days: Never a smoker  Illegal Drugs use: Have You Used Any Illegal Substances Over the Past 12 Months: No    24 hour chart check complete: yes     Patient goal(s) for today:   Treatment team focus/goals:   Progress note     LOS:  6  Expected LOS:     Participating treatment team members: Rolanda Ibanez, * (assigned SW),

## 2019-02-10 NOTE — BH NOTES
GROUP THERAPY PROGRESS NOTE    Angela Abdi is participating in [place].      Group time: 30 minutes    Personal goal for participation: discuss unit routines and handout about boundaries with others    Goal orientation: community    Group therapy participation: active    Therapeutic interventions reviewed and discussed: group to share ideas related to importance of boundaries    Impression of participation: patient asked appropriate questions and interacted well with topic     is Ritika Schmidt [facility]

## 2019-02-10 NOTE — PROGRESS NOTES
Problem: Depressed Mood (Adult/Pediatric)  Goal: *STG: Remains safe in hospital  Outcome: Progressing Towards Goal  Lying quietly in bed with eyes closed, respirations even and unlabored , NAD noted   Q15 min safety monitoring continues

## 2019-02-10 NOTE — BH NOTES
Patient was talking with another peer. 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 Patient continued talking and spoke in louder and louder tone becoming agitated and angry. She kept talking about it for 15 minutes and disruptive to unit. \"I've been fighting and fighting to get my license back. I had a  say they wouldn't take my case. 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 They are hell bent on not giving my license! If I know I'm right, you got hell of a fight! Once they snatch it; it's hard to get it back. . You got hell of a fight on your hands. Hui García \"  Staff attempt to provide gentle redirection due to level of agitation.

## 2019-02-10 NOTE — PROGRESS NOTES
Problem: Aggression and Hostility (Behavioral Health)  Goal: *Reduce intensity and frequency of aggressive behaviors and verbalizations, treating others with respect  Outcome: Progressing Towards Goal  Education provided. Coping skills encouraged. Impaired judgement. Labile mood. Demanding. Tolerating education fair. Poor insight. Blaming others. Argumentative. Ruminating .
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
## 2019-02-10 NOTE — PROGRESS NOTES
GROUP THERAPY PROGRESS NOTE    Aysha Washington is participating in [place].      Group time: 25 minutes    Personal goal for participation: No goal stated    Goal orientation: N/A    Group therapy participation: minimal    Therapeutic interventions reviewed and discussed: Reviewed the rules and regulations of the unit as well as set goals for the day    Impression of participation: Minimal

## 2019-02-10 NOTE — PROGRESS NOTES
Problem: Depressed Mood (Adult/Pediatric)  Goal: *STG: Demonstrates reduction in symptoms and increase in insight into coping skills/future focused  Outcome: Progressing Towards Goal  Patient is received quiet, calm and cooperative. Staff continue q15 checks and encourage pt to share feelings.

## 2019-02-10 NOTE — BH NOTES
PRN Medication Documentation    Specific patient behavior that led to need for PRN medication: stomach cramps, \"bubbling\"   Staff interventions attempted prior to PRN being given: breakfast  PRN medication given: Bentyl 10 mg po  Patient response/effectiveness of PRN medication: 5858 NAD noted. No complaints of discomfort. 1255 Pt pacing for several minutes and then came to nurse's station and stated, \"I apologize, some people apparently think I'm full of shit. \" No further clarification provided. Pt continued pacing. Appears irritable. 1300 Pt returned and stated, \"I need a shot for my anxiety. \"  PRN Medication Documentation    Specific patient behavior that led to need for PRN medication: pacing, angry affect, irritability, anxiety, paranoid about others conversations   Staff interventions attempted prior to PRN being given: allowing pt to express feelings, attempting to understand precipitating factors (pt would not answer when asked)  PRN medication given: Ativan 1 mg po  Patient response/effectiveness of PRN medication: 1350 Pt resting quietly in bed. No distress noted.

## 2019-02-10 NOTE — BH NOTES
1250 While pt was pacing throughout hallway, she started to mumble to self \"She's just like everybody else. Talking shit behind my back, but not to my face. I know she's talking shit. \" Pt continued to pace throughout hallway. 0 Pt apologized to staff,  \"I'm sorry for being full of shit\"    Pt fixated on the phone. Seems to get agitated when someone else on the phone is having a productive or meaningful conversation. Pt continues to pace down hallway and talk to self. Pt is monitored for safety.

## 2019-02-10 NOTE — BH NOTES
Problem: Aggression and Hostility (Behavioral Health)  Goal: *Reduce intensity and frequency of aggressive behaviors and verbalizations, treating others with respect  Outcome: Progressing Towards Goal  Education provided. Coping skills encouraged. Impaired judgement. Labile mood. Demanding. Tolerating education fair. Poor insight. Blaming others. Argumentative. Ruminating .          Medication meal compliant. Needs expressed. Staff addresses pt needs throughout shift.

## 2019-02-11 VITALS
RESPIRATION RATE: 16 BRPM | HEART RATE: 95 BPM | HEIGHT: 68 IN | BODY MASS INDEX: 36.46 KG/M2 | TEMPERATURE: 98.5 F | SYSTOLIC BLOOD PRESSURE: 132 MMHG | DIASTOLIC BLOOD PRESSURE: 91 MMHG | OXYGEN SATURATION: 99 % | WEIGHT: 240.6 LBS

## 2019-02-11 PROCEDURE — 74011250637 HC RX REV CODE- 250/637: Performed by: PSYCHIATRY & NEUROLOGY

## 2019-02-11 PROCEDURE — 74011250637 HC RX REV CODE- 250/637: Performed by: NURSE PRACTITIONER

## 2019-02-11 RX ORDER — HYDROXYZINE 50 MG/1
50 TABLET, FILM COATED ORAL
Qty: 90 TAB | Refills: 0 | Status: SHIPPED | OUTPATIENT
Start: 2019-02-11 | End: 2019-02-21

## 2019-02-11 RX ORDER — ESCITALOPRAM OXALATE 10 MG/1
20 TABLET ORAL
Status: DISCONTINUED | OUTPATIENT
Start: 2019-02-11 | End: 2019-02-11

## 2019-02-11 RX ORDER — QUETIAPINE FUMARATE 50 MG/1
400 TABLET, FILM COATED ORAL
Qty: 30 TAB | Refills: 0 | Status: SHIPPED | OUTPATIENT
Start: 2019-02-11 | End: 2019-02-11

## 2019-02-11 RX ORDER — ESCITALOPRAM OXALATE 10 MG/1
10 TABLET ORAL
Status: DISCONTINUED | OUTPATIENT
Start: 2019-02-12 | End: 2019-02-11 | Stop reason: HOSPADM

## 2019-02-11 RX ORDER — QUETIAPINE FUMARATE 400 MG/1
400 TABLET, FILM COATED ORAL
Qty: 30 TAB | Refills: 0 | Status: SHIPPED | OUTPATIENT
Start: 2019-02-11

## 2019-02-11 RX ADMIN — AMLODIPINE BESYLATE 10 MG: 5 TABLET ORAL at 08:37

## 2019-02-11 RX ADMIN — LEVOTHYROXINE SODIUM 50 MCG: 25 TABLET ORAL at 06:13

## 2019-02-11 RX ADMIN — QUETIAPINE FUMARATE 200 MG: 100 TABLET ORAL at 12:16

## 2019-02-11 RX ADMIN — LABETALOL HYDROCHLORIDE 100 MG: 100 TABLET, FILM COATED ORAL at 08:37

## 2019-02-11 RX ADMIN — DICYCLOMINE HYDROCHLORIDE 10 MG: 10 CAPSULE ORAL at 08:37

## 2019-02-11 RX ADMIN — DICYCLOMINE HYDROCHLORIDE 10 MG: 10 CAPSULE ORAL at 12:16
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
## 2019-02-11 NOTE — INTERDISCIPLINARY ROUNDS
Behavioral Health Interdisciplinary Rounds     Patient Name: Bernardo Drew  Age: 34 y.o. Room/Bed:  730/01  Primary Diagnosis: Bipolar I disorder with diane ([facility] [address]   Admission Status: Voluntary     Readmission within 30 days: no  Power of  in place: no  Patient requires a blocked bed: no          Reason for blocked bed:   Sleep hours:       Participation in Care/Groups:  yes  Medication Compliant?: Yes  PRNS (last 24 hours): Antianxiety    Restraints (last 24 hours):  no     Alcohol screening (AUDIT) completed -  AUDIT Score: 0  If applicable, date SBIRT discussed in treatment team AND documented:    Tobacco - patient is a smoker: Have You Used Tobacco in the Past 30 Days: Never a smoker  Illegal Drugs use: Have You Used Any Illegal Substances Over the Past 12 Months: No    24 hour chart check complete: yes     Patient goal(s) for today:   Treatment team focus/goals:Plan for discharge. Progress note : Plan for discharge today.      LOS:  7  Expected LOS: plan for discharge today    Participating treatment team members: Helga Sun RN

## 2019-02-11 NOTE — PROGRESS NOTES
Problem: Depressed Mood (Adult/Pediatric)  Goal: *STG: Verbalizes anger, guilt, and other feelings in a constructive manor  Outcome: Progressing Towards Goal  Variance: Patient slowly responding. At times can be polite and cooperative but quickly shifts to irritable. Ar Eckert  DISCHARGE SUMMARY from Nurse    PATIENT INSTRUCTIONS:      What to do at Home:  Recommended activity: Activity as tolerated,     *  Please give a list of your current medications to your Primary Care Provider. *  Please update this list whenever your medications are discontinued, doses are      changed, or new medications (including over-the-counter products) are added. *  Please carry medication information at all times in case of emergency situations. These are general instructions for a healthy lifestyle:    No smoking/ No tobacco products/ Avoid exposure to second hand smoke  Surgeon General's Warning:  Quitting smoking now greatly reduces serious risk to your health. Obesity, smoking, and sedentary lifestyle greatly increases your risk for illness    A healthy diet, regular physical exercise & weight monitoring are important for maintaining a healthy lifestyle    You may be retaining fluid if you have a history of heart failure or if you experience any of the following symptoms:  Weight gain of 3 pounds or more overnight or 5 pounds in a week, increased swelling in our hands or feet or shortness of breath while lying flat in bed. Please call your doctor as soon as you notice any of these symptoms; do not wait until your next office visit. Recognize signs and symptoms of STROKE:    F-face looks uneven    A-arms unable to move or move unevenly    S-speech slurred or non-existent    T-time-call 911 as soon as signs and symptoms begin-DO NOT go       Back to bed or wait to see if you get better-TIME IS BRAIN. Warning Signs of HEART ATTACK     Call 911 if you have these symptoms:   Chest discomfort.  Most heart attacks involve discomfort in the center of the chest that lasts more than a few minutes, or that goes away and comes back. It can feel like uncomfortable pressure, squeezing, fullness, or pain.  Discomfort in other areas of the upper body. Symptoms can include pain or discomfort in one or both arms, the back, neck, jaw, or stomach.  Shortness of breath with or without chest discomfort.  Other signs may include breaking out in a cold sweat, nausea, or lightheadedness. Don't wait more than five minutes to call 911 - MINUTES MATTER! Fast action can save your life. Calling 911 is almost always the fastest way to get lifesaving treatment. Emergency Medical Services staff can begin treatment when they arrive -- up to an hour sooner than if someone gets to the hospital by car. The discharge information has been reviewed with the patient. The patient verbalized understanding. Discharge medications reviewed with the patient and appropriate educational materials and side effects teaching were provided. Patient received all valuables and belongings. Prescriptions given to patient. Transportation to be provided by cab.  ___________________________________________________________________________________________________________________________________

## 2019-02-11 NOTE — BH NOTES
Pt received discharge instructions prescriptions, follow up information, contact information. Crisis plan reviewed. Opportunity for questions and carnification provided. Pt verbalizes understanding of information provided. Pt discharged with personal belongings.
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
## 2019-02-11 NOTE — BH NOTES
1936 Pt pacing unit. Attempting to start arguments with staff. Manipulative. Asked nurse to let her have from her bag of belongings a group of plastic papers that are connected by 6 wires. When nurse tried to explain why we can't allow this on the unit that someone may hurt themselves with this, she began yelling at nurse, very confrontational and verbally aggressive with nurse. 2100 calm/cooperative, watching tv and talking about music that she likes.

## 2019-02-11 NOTE — BH NOTES
GROUP THERAPY PROGRESS NOTE    The patient Elva Tunrer is participating in [employer]. Group time: 30 minutes    Personal goal for participation: to orient the patient to the unit.     Goal orientation: successful adoption of unit rules    Group therapy participation: active    Therapeutic interventions reviewed and discussed: Yes    Impression of participation:     Rachael Love  2/11/2019 10:31 AM

## 2019-02-11 NOTE — BH NOTES
GROUP THERAPY PROGRESS NOTE    Daryle Colla did not participate in a 25 minute Acute Unit Process Group, with a focus identifying feelings, planning for the rest of the day, and discussing discharge.

## 2019-02-11 NOTE — PROGRESS NOTES
Problem: Falls - Risk of  Goal: *Absence of Falls  Document Mc Fall Risk and appropriate interventions in the flowsheet. Outcome: Progressing Towards Goal  Fall Risk Interventions:  Lying quietly in bed with eyes closed , respirations even and unlabored , NAD noted   Q15 min safety monitoring continues , Has been free of falls ,   Mobility Interventions: Assess mobility with egress test    Mentation Interventions: Adequate sleep, hydration, pain control    Medication Interventions: Teach patient to arise slowly    Elimination Interventions:  Toilet paper/wipes in reach    History of Falls Interventions: Door open when patient unattended

## 2019-07-07 ENCOUNTER — HOSPITAL ENCOUNTER (EMERGENCY)
Age: 39
Discharge: HOME | End: 2019-07-07
Attending: EMERGENCY MEDICINE
Payer: MEDICARE

## 2019-07-07 VITALS
RESPIRATION RATE: 18 BRPM | DIASTOLIC BLOOD PRESSURE: 123 MMHG | SYSTOLIC BLOOD PRESSURE: 168 MMHG | HEART RATE: 78 BPM | TEMPERATURE: 98.4 F | OXYGEN SATURATION: 100 %

## 2019-07-07 DIAGNOSIS — K08.89 TOOTHACHE: Primary | ICD-10-CM

## 2019-07-07 DIAGNOSIS — R45.851 SUICIDAL IDEATION: ICD-10-CM

## 2019-07-07 PROCEDURE — 74011250637 HC RX REV CODE- 250/637: Performed by: EMERGENCY MEDICINE

## 2019-07-07 PROCEDURE — 99284 EMERGENCY DEPT VISIT MOD MDM: CPT

## 2019-07-07 PROCEDURE — 90791 PSYCH DIAGNOSTIC EVALUATION: CPT

## 2019-07-07 RX ORDER — HYDROCODONE BITARTRATE AND ACETAMINOPHEN 7.5; 325 MG/1; MG/1
1 TABLET ORAL
Qty: 3 TAB | Refills: 0 | Status: SHIPPED | OUTPATIENT
Start: 2019-07-07 | End: 2019-08-06

## 2019-07-07 RX ORDER — HYDROCODONE BITARTRATE AND ACETAMINOPHEN 7.5; 325 MG/1; MG/1
1 TABLET ORAL
Status: COMPLETED | OUTPATIENT
Start: 2019-07-07 | End: 2019-07-07

## 2019-07-07 RX ADMIN — HYDROCODONE BITARTRATE AND ACETAMINOPHEN 1 TABLET: 7.5; 325 TABLET ORAL at 09:21

## 2019-07-07 NOTE — DISCHARGE INSTRUCTIONS
Patient Education        Tooth and Gum Pain: Care Instructions  Your Care Instructions    The most common causes of dental pain are tooth decay and gum disease. Pain can also be caused by an infection of the tooth (abscess) or the gums. Or you may have pain from a broken or cracked tooth. Other causes of pain include infection and damage to a tooth from nervous grinding of your teeth. A wisdom tooth can be painful when it is coming in but cannot break through the gum. It can also be painful when the tooth is only partway in and extra gum tissue has formed around it. The tissue can get inflamed (pericoronitis), and sometimes it gets infected. Prompt dental care can help find the cause of your toothache and keep the tooth from dying or gum disease from getting worse. Self-care at home may reduce your pain and discomfort. Follow-up care is a key part of your treatment and safety. Be sure to make and go to all appointments, and call your dentist or doctor if you are having problems. It's also a good idea to know your test results and keep a list of the medicines you take. How can you care for yourself at home? · To reduce pain and facial swelling, put an ice or cold pack on the outside of your cheek for 10 to 20 minutes at a time. Put a thin cloth between the ice and your skin. Do not use heat. · If your doctor prescribed antibiotics, take them as directed. Do not stop taking them just because you feel better. You need to take the full course of antibiotics. · Ask your doctor if you can take an over-the-counter pain medicine, such as acetaminophen (Tylenol), ibuprofen (Advil, Motrin), or naproxen (Aleve). Be safe with medicines. Read and follow all instructions on the label. · Avoid very hot, cold, or sweet foods and drinks if they increase your pain. · Rinse your mouth with warm salt water every 2 hours to help relieve pain and swelling. Mix 1 teaspoon of salt in 8 ounces of water.   · Talk to your dentist about using special toothpaste for sensitive teeth. To reduce pain on contact with heat or cold or when brushing, brush with this toothpaste regularly or rub a small amount of the paste on the sensitive area with a clean finger 2 or 3 times a day. Floss gently between your teeth. · Do not smoke or use spit tobacco. Tobacco use can make gum problems worse, decreases your ability to fight infection in your gums, and delays healing. If you need help quitting, talk to your doctor about stop-smoking programs and medicines. These can increase your chances of quitting for good. When should you call for help? Call 911 anytime you think you may need emergency care. For example, call if:    · You have trouble breathing.    Call your dentist or doctor now or seek immediate medical care if:    · You have signs of infection, such as:  ? Increased pain, swelling, warmth, or redness. ? Red streaks leading from the area. ? Pus draining from the area. ? A fever.    Watch closely for changes in your health, and be sure to contact your doctor if:    · You do not get better as expected. Where can you learn more? Go to http://bernard-maritza.info/. Enter [id number] [id number] in the search box to learn more about \"Tooth and Gum Pain: Care Instructions. \"  Current as of: March 27, 2018  Content Version: 11.9  © 4831-7778 [employer]. Care instructions adapted under license by [employer] (which disclaims liability or warranty for this information). If you have questions about a medical condition or this instruction, always ask your healthcare professional. [place] [address] any warranty or liability for your use of this information.
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
## 2019-07-07 NOTE — ED NOTES
Patient discharged home. . Provided her with written copies of discharge instructions and prescriptions. Opportunity for questions to be answered. Discharged with steady gait.  Two bus tickets given to patient

## 2019-07-07 NOTE — ED TRIAGE NOTES
Triage: Patient arrives from home with c/o tooth pain that started Wednesday after her wisdom tooth was removed from her lower left jaw. Patient states that she has not been able to sleep much since her wisdom tooth was removed and is triggering her Bipolar disease. Patient states she has had some thoughts of HI/SI since not able to sleep.
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
## 2021-02-18 NOTE — ED NOTES
Gave patient her own supply of medications was present during the administration. Has virtual appt today.